# Patient Record
Sex: MALE | Race: WHITE | Employment: FULL TIME | URBAN - METROPOLITAN AREA
[De-identification: names, ages, dates, MRNs, and addresses within clinical notes are randomized per-mention and may not be internally consistent; named-entity substitution may affect disease eponyms.]

---

## 2018-12-23 ENCOUNTER — HOSPITAL ENCOUNTER (EMERGENCY)
Age: 43
Discharge: HOME | End: 2018-12-23
Payer: COMMERCIAL

## 2018-12-23 ENCOUNTER — APPOINTMENT (OUTPATIENT)
Dept: GENERAL RADIOLOGY | Age: 43
End: 2018-12-23
Payer: COMMERCIAL

## 2018-12-23 VITALS
OXYGEN SATURATION: 96 % | DIASTOLIC BLOOD PRESSURE: 90 MMHG | HEIGHT: 73 IN | SYSTOLIC BLOOD PRESSURE: 142 MMHG | TEMPERATURE: 98.1 F | BODY MASS INDEX: 37.77 KG/M2 | HEART RATE: 86 BPM | WEIGHT: 285 LBS | RESPIRATION RATE: 18 BRPM

## 2018-12-23 DIAGNOSIS — M79.642 HAND PAIN, LEFT: Primary | ICD-10-CM

## 2018-12-23 PROCEDURE — 73110 X-RAY EXAM OF WRIST: CPT

## 2018-12-23 PROCEDURE — 99212 OFFICE O/P EST SF 10 MIN: CPT

## 2018-12-23 PROCEDURE — 73130 X-RAY EXAM OF HAND: CPT

## 2018-12-23 RX ORDER — NAPROXEN 500 MG/1
500 TABLET ORAL 2 TIMES DAILY
Qty: 14 TABLET | Refills: 0 | Status: SHIPPED | OUTPATIENT
Start: 2018-12-23 | End: 2018-12-30

## 2018-12-23 ASSESSMENT — PAIN DESCRIPTION - PROGRESSION: CLINICAL_PROGRESSION: GRADUALLY WORSENING

## 2018-12-23 ASSESSMENT — PAIN DESCRIPTION - PAIN TYPE: TYPE: ACUTE PAIN

## 2018-12-23 ASSESSMENT — PAIN DESCRIPTION - DESCRIPTORS: DESCRIPTORS: SORE;TIGHTNESS

## 2018-12-23 ASSESSMENT — PAIN DESCRIPTION - FREQUENCY: FREQUENCY: CONTINUOUS

## 2018-12-23 ASSESSMENT — PAIN DESCRIPTION - LOCATION: LOCATION: HAND;WRIST

## 2018-12-23 ASSESSMENT — PAIN DESCRIPTION - ORIENTATION: ORIENTATION: LEFT

## 2018-12-23 ASSESSMENT — PAIN SCALES - GENERAL: PAINLEVEL_OUTOF10: 5

## 2018-12-23 ASSESSMENT — PAIN DESCRIPTION - ONSET: ONSET: GRADUAL

## 2018-12-23 NOTE — ED PROVIDER NOTES
Department of Emergency [address] [facility]  Provider Note  Admit Date/Time: 12/23/2018  1:18 PM  Room: 06/06  MRN: [id number]  Chief Complaint: Hand Pain (Having pain and swelling in left hand and wrist. Denies specific injury.)       History of Present Illness   Source of history provided by: Patient. History/Exam Limitations: None. Trinh Taylor is a 37 y.o. male with a history of DM, hypertension, and gout. He reports a 2 day history of atraumatic left hand and wrist tenderness and edema. Denies any distal paresthesias or weakness. There was no preceding fall or injury. He does have a history of gout however has only had that in the foot. Denies any fevers, chills, nausea, or vomiting. No history of other arthritides or intra-articular infections. Is not an IV drug user. Is right-hand dominant. ROS    Pertinent positives and negatives are stated within HPI, all other systems reviewed and are negative. History reviewed. No pertinent surgical history. Social History:  reports that he has never smoked. He has never used smokeless tobacco.  Family History: family history is not on file. Allergies: Patient has no known allergies. Physical Exam   Oxygen Saturation Interpretation: Normal.   ED Triage Vitals [12/23/18 1319]   BP Temp Temp Source Pulse Resp SpO2 Height Weight   (!) 142/90 98.1 °F (36.7 °C) Oral 86 18 96 % 6' 1\" (1.854 m) 285 lb (129.3 kg)     Physical Exam   Physical Exam:  General: Vitals noted, no distress. Afebrile. EENT: TMs clear. Eyes unremarkable. Posterior oropharynx unremarkable. Cardiac: Regular, rate, rhythm, no murmur. Pulmonary: Lungs clear bilaterally with good aeration. No adventitious breath sounds. Abdomen: Soft, nontender, nonsurgical. No peritoneal signs. Normoactive bowel sounds. Extremities: Exam left hand shows some tenderness and edema about the ulnar aspect of the hand and wrist. No erythema or warmth.  No pain with and discussed with the patient to supplement those generated by the EMR. We also discussed medications that were prescribed (if any) including common side effects and interactions. The patient was advised to abstain from driving, operating heavy machinery or making significant decisions while taking medications such as opiates and muscle relaxers that may impair this. All questions were addressed. They understand return precautions and discharge instructions. The patient and/or family/friend/caregiver expressed understanding. Assessment      1. Hand pain, left      Plan   Discharge to home and advised to contact Deborah Reyes, [address] [facility] [address] [facility]  [phone]      As needed   Patient condition is good    New Medications     New Prescriptions    NAPROXEN (NAPROSYN) 500 MG TABLET    Take 1 tablet by mouth 2 times daily for 7 days     Electronically signed by JORDYN Maya   DD: 12/23/18  **This report was transcribed using voice recognition software. Every effort was made to ensure accuracy; however, inadvertent computerized transcription errors may be present.   END OF ED PROVIDER NOTE          Ivon James [address] [place], [place]  12/23/18 1 [facility] [address] [place], [place]  12/23/18 8090